# Patient Record
Sex: FEMALE | Race: WHITE | NOT HISPANIC OR LATINO | Employment: OTHER | URBAN - METROPOLITAN AREA
[De-identification: names, ages, dates, MRNs, and addresses within clinical notes are randomized per-mention and may not be internally consistent; named-entity substitution may affect disease eponyms.]

---

## 2024-02-02 NOTE — PROGRESS NOTES
HEARING AID CHECK    RIGHT: Phonbasil De La Rosao B50-R SN: [id number]  : ***  Wax Guard/Dome: ***  LEFT: [employer]onbasil De La Rosao B50-R SN: [id number]  : ***  Wax Guard/Dome: ***    Repair Warranty: out of warranty (4/2/2022)  L&D Warranty: out of warranty (4/2/2022)    Krissy Mcconnell was seen for a hearing aid check following ENT and audiometric evaluation appts.  Listening check revealed {GOOD/FAIR/POOR:93589} working function of devices.  Hearing aids were cleaned and {hac:65093}.  Patient satisfied.    {hacprice:10747}    Ce Jnoes, CCC-A    Appt time: *** - ***

## 2024-02-08 ENCOUNTER — APPOINTMENT (OUTPATIENT)
Dept: AUDIOLOGY | Facility: CLINIC | Age: 89
End: 2024-02-08